# Patient Record
Sex: FEMALE | Race: WHITE | NOT HISPANIC OR LATINO
[De-identification: names, ages, dates, MRNs, and addresses within clinical notes are randomized per-mention and may not be internally consistent; named-entity substitution may affect disease eponyms.]

---

## 2023-03-09 ENCOUNTER — APPOINTMENT (OUTPATIENT)
Dept: ORTHOPEDIC SURGERY | Facility: CLINIC | Age: 83
End: 2023-03-09
Payer: MEDICARE

## 2023-03-09 DIAGNOSIS — I10 ESSENTIAL (PRIMARY) HYPERTENSION: ICD-10-CM

## 2023-03-09 DIAGNOSIS — Z86.39 PERSONAL HISTORY OF OTHER ENDOCRINE, NUTRITIONAL AND METABOLIC DISEASE: ICD-10-CM

## 2023-03-09 DIAGNOSIS — Z85.3 PERSONAL HISTORY OF MALIGNANT NEOPLASM OF BREAST: ICD-10-CM

## 2023-03-09 PROCEDURE — 73564 X-RAY EXAM KNEE 4 OR MORE: CPT | Mod: RT

## 2023-03-09 PROCEDURE — 99213 OFFICE O/P EST LOW 20 MIN: CPT | Mod: 25

## 2023-03-09 PROCEDURE — 20610 DRAIN/INJ JOINT/BURSA W/O US: CPT | Mod: 50

## 2023-03-09 NOTE — HISTORY OF PRESENT ILLNESS
[Gradual] : gradual [4] : 4 [7] : 7 [Dull/Aching] : dull/aching [Localized] : localized [Constant] : constant [Household chores] : household chores [Leisure] : leisure [Sleep] : sleep [Meds] : meds [Sitting] : sitting [Standing] : standing [Walking] : walking [] : Post Surgical Visit: no [FreeTextEntry1] : B/L knees [FreeTextEntry5] : This is a 81 y/o F here for bilateral knee pain for many years with no specific injury. Hx Polymyalgia rheumatica. Hx bilateral meniscectomies. Previous Tx by Dr. Pearce with gel injections. Right knee pain > left knee pain. Pain most prevalent when getting up after prolonged sitting.  [FreeTextEntry9] : ibuprofen [de-identified] : 12/2021 [de-identified] : Dr. Pearce [de-identified] : n/a

## 2023-03-09 NOTE — ASSESSMENT
[FreeTextEntry1] : GOOD RELIEF WITH ORTHOVISC 2 YRS AGO\par PAIN RECURRED\par NO NEW INJURY\par XRAYS STABLE\par START SERIES AGAIN, R/B/A REVIEWED

## 2023-03-09 NOTE — IMAGING
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

## 2023-03-16 ENCOUNTER — APPOINTMENT (OUTPATIENT)
Dept: ORTHOPEDIC SURGERY | Facility: CLINIC | Age: 83
End: 2023-03-16
Payer: MEDICARE

## 2023-03-16 VITALS — HEIGHT: 66 IN | WEIGHT: 130 LBS | BODY MASS INDEX: 20.89 KG/M2

## 2023-03-16 DIAGNOSIS — Z87.39 PERSONAL HISTORY OF OTHER DISEASES OF THE MUSCULOSKELETAL SYSTEM AND CONNECTIVE TISSUE: ICD-10-CM

## 2023-03-16 PROCEDURE — 99024 POSTOP FOLLOW-UP VISIT: CPT

## 2023-03-16 PROCEDURE — 20610 DRAIN/INJ JOINT/BURSA W/O US: CPT | Mod: 50

## 2023-03-16 NOTE — PROCEDURE
[FreeTextEntry3] : Large joint injection was performed of the BILAT knee. \par The indication for this procedure was pain, inflammation and x-ray evidence of Osteoarthritis on this or prior visit. The site was prepped with alcohol, betadine, ethyl chloride sprayed topically and sterile technique used. \par An injection of ORTHOVISC 2ml, series #2 was used.  \par Patient was advised to call if redness, pain or fever occur, apply ice for 15 minutes out of every hour for the next 12-24 hours as tolerated and patient was advised to rest the joint(s) for 2 days. Patient has tried OTC's including aspirin, Ibuprofen, Aleve, etc or prescription NSAIDS, and/or exercises at home and/or physical therapy without satisfactory response, patient had decreased mobility in the joint and the risks benefits, and alternatives have been discussed, and verbal consent was obtained. \par

## 2023-03-16 NOTE — HISTORY OF PRESENT ILLNESS
[Dull/Aching] : dull/aching [Localized] : localized [Retired] : Work status: retired [2] : 2 [Orthovisc] : Orthovisc [Gradual] : gradual [4] : 4 [7] : 7 [Constant] : constant [Household chores] : household chores [Leisure] : leisure [Sleep] : sleep [Meds] : meds [Sitting] : sitting [Standing] : standing [Walking] : walking [de-identified] : 3/16/23: Here for Orthovisc #2 bilateral knees  [] : Post Surgical Visit: no [FreeTextEntry1] : B/L knees [FreeTextEntry3] : N/A Chronic [FreeTextEntry5] : This is a 83 y/o F here for bilateral knee pain for many years with no specific injury. Hx Polymyalgia rheumatica. Hx bilateral meniscectomies. Previous Tx by Dr. Pearce with gel injections. Right knee pain > left knee pain. Pain most prevalent when getting up after prolonged sitting.  [FreeTextEntry9] : ibuprofen [de-identified] : 12/2021 [de-identified] : Dr. 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

## 2023-03-23 ENCOUNTER — APPOINTMENT (OUTPATIENT)
Dept: ORTHOPEDIC SURGERY | Facility: CLINIC | Age: 83
End: 2023-03-23
Payer: MEDICARE

## 2023-03-23 PROCEDURE — 99024 POSTOP FOLLOW-UP VISIT: CPT

## 2023-03-23 PROCEDURE — 20610 DRAIN/INJ JOINT/BURSA W/O US: CPT | Mod: 50

## 2023-03-23 NOTE — HISTORY OF PRESENT ILLNESS
[Gradual] : gradual [4] : 4 [Dull/Aching] : dull/aching [Localized] : localized [Constant] : constant [Household chores] : household chores [Leisure] : leisure [Sleep] : sleep [Meds] : meds [Sitting] : sitting [Standing] : standing [Walking] : walking [Retired] : Work status: retired [3] : 3 [Orthovisc] : Orthovisc [5] : 5 [de-identified] : 3/16/23: Here for Orthovisc #2 bilateral knees  [] : Post Surgical Visit: no [FreeTextEntry1] : B/L knees [FreeTextEntry3] : N/A Chronic [FreeTextEntry5] : This is a 81 y/o F here for bilateral knee pain for many years with no specific injury. Hx Polymyalgia rheumatica. Hx bilateral meniscectomies. Previous Tx by Dr. Pearce with gel injections. Right knee pain > left knee pain. Pain most prevalent when getting up after prolonged sitting.  [FreeTextEntry9] : ibuprofen [de-identified] : 12/2021 [de-identified] : Dr. 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

## 2023-03-30 ENCOUNTER — APPOINTMENT (OUTPATIENT)
Dept: ORTHOPEDIC SURGERY | Facility: CLINIC | Age: 83
End: 2023-03-30
Payer: MEDICARE

## 2023-03-30 VITALS — WEIGHT: 130 LBS | HEIGHT: 66 IN | BODY MASS INDEX: 20.89 KG/M2

## 2023-03-30 DIAGNOSIS — M17.12 UNILATERAL PRIMARY OSTEOARTHRITIS, LEFT KNEE: ICD-10-CM

## 2023-03-30 DIAGNOSIS — M17.11 UNILATERAL PRIMARY OSTEOARTHRITIS, RIGHT KNEE: ICD-10-CM

## 2023-03-30 PROCEDURE — 20610 DRAIN/INJ JOINT/BURSA W/O US: CPT | Mod: 50

## 2023-03-30 PROCEDURE — 99024 POSTOP FOLLOW-UP VISIT: CPT

## 2023-03-30 NOTE — HISTORY OF PRESENT ILLNESS
[Gradual] : gradual [5] : 5 [Dull/Aching] : dull/aching [Localized] : localized [Constant] : constant [Household chores] : household chores [Leisure] : leisure [Sleep] : sleep [Meds] : meds [Sitting] : sitting [Standing] : standing [Walking] : walking [Retired] : Work status: retired [4] : 4 [Orthovisc] : Orthovisc [de-identified] : 3/16/23: Here for Orthovisc #2 bilateral knees  [] : Post Surgical Visit: no [FreeTextEntry1] : B/L knees [FreeTextEntry3] : N/A Chronic [FreeTextEntry5] : This is a 81 y/o F here for bilateral knee pain for many years with no specific injury. Hx Polymyalgia rheumatica. Hx bilateral meniscectomies. Previous Tx by Dr. Pearce with gel injections. Right knee pain > left knee pain. Pain most prevalent when getting up after prolonged sitting.  [FreeTextEntry9] : ibuprofen [de-identified] : 12/2021 [de-identified] : Dr. 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

## 2024-06-11 ENCOUNTER — APPOINTMENT (OUTPATIENT)
Dept: MRI IMAGING | Facility: CLINIC | Age: 84
End: 2024-06-11
Payer: MEDICARE

## 2024-06-11 ENCOUNTER — APPOINTMENT (OUTPATIENT)
Dept: ORTHOPEDIC SURGERY | Facility: CLINIC | Age: 84
End: 2024-06-11
Payer: MEDICARE

## 2024-06-11 VITALS — WEIGHT: 130 LBS | BODY MASS INDEX: 20.89 KG/M2 | HEIGHT: 66 IN

## 2024-06-11 DIAGNOSIS — I48.91 UNSPECIFIED ATRIAL FIBRILLATION: ICD-10-CM

## 2024-06-11 DIAGNOSIS — Z86.73 PERSONAL HISTORY OF TRANSIENT ISCHEMIC ATTACK (TIA), AND CEREBRAL INFARCTION W/OUT RESIDUAL DEFICITS: ICD-10-CM

## 2024-06-11 DIAGNOSIS — Z78.9 OTHER SPECIFIED HEALTH STATUS: ICD-10-CM

## 2024-06-11 DIAGNOSIS — M51.36 OTHER INTERVERTEBRAL DISC DEGENERATION, LUMBAR REGION: ICD-10-CM

## 2024-06-11 PROCEDURE — 72110 X-RAY EXAM L-2 SPINE 4/>VWS: CPT

## 2024-06-11 PROCEDURE — 99214 OFFICE O/P EST MOD 30 MIN: CPT

## 2024-06-11 PROCEDURE — 72070 X-RAY EXAM THORAC SPINE 2VWS: CPT

## 2024-06-11 PROCEDURE — 72170 X-RAY EXAM OF PELVIS: CPT

## 2024-06-11 PROCEDURE — 72148 MRI LUMBAR SPINE W/O DYE: CPT

## 2024-06-11 RX ORDER — WARFARIN SODIUM 6 MG/1
TABLET ORAL
Refills: 0 | Status: ACTIVE | COMMUNITY

## 2024-06-11 RX ORDER — HYDROCODONE BITARTRATE AND ACETAMINOPHEN 10; 325 MG/1; MG/1
10-325 TABLET ORAL TWICE DAILY
Qty: 30 | Refills: 0 | Status: ACTIVE | COMMUNITY
Start: 2024-06-11 | End: 1900-01-01

## 2024-06-11 NOTE — IMAGING
[FreeTextEntry1] : Multilevel degenerative changes including spondylolisthesis at 4 5, compression fracture with significant wedge deformity noted at L1 age undetermined [de-identified] : Normal

## 2024-06-11 NOTE — HISTORY OF PRESENT ILLNESS
[Lower back] : lower back [8] : 8 [Dull/Aching] : dull/aching [Radiating] : radiating [Constant] : constant [Nothing helps with pain getting better] : Nothing helps with pain getting better [de-identified] :  06/11/2024: She is for initial orthopedic evaluation states she had a trip and fall in her home approximately 1 week ago.  She was taken to [facility] was evaluated for lower back pain and limited ability to ambulate.  She notes she was found to have an acute L1 fracture.  She was given a prescription for methocarbamol with minimal help.  She has since been walking with a walker.  She has also noted radicular complaints and tingling in the left lower extremity from the knee into the left foot.  Past medical history of CVA and A-fib she notes she is presently taking Coumadin [] : no [FreeTextEntry5] : 82 yo F presenting with L1 fx that occurred after falling at home 1 week ago. Went to [facility] ER; found fx. Given muscle relaxers; helped slightly. Numbness into b/l feet.  [FreeTextEntry7] : b/l legs

## 2024-06-13 ENCOUNTER — APPOINTMENT (OUTPATIENT)
Dept: ORTHOPEDIC SURGERY | Facility: CLINIC | Age: 84
End: 2024-06-13
Payer: MEDICARE

## 2024-06-13 PROCEDURE — 99214 OFFICE O/P EST MOD 30 MIN: CPT

## 2024-06-13 NOTE — HISTORY OF PRESENT ILLNESS
[Lower back] : lower back [8] : 8 [Dull/Aching] : dull/aching [Radiating] : radiating [Constant] : constant [Nothing helps with pain getting better] : Nothing helps with pain getting better [de-identified] : 6/13/24: Follow up L spine. MRI review.   06/11/2024: She is for initial orthopedic evaluation states she had a trip and fall in her home approximately 1 week ago.  She was taken to [facility] was evaluated for lower back pain and limited ability to ambulate.  She notes she was found to have an acute L1 fracture.  She was given a prescription for methocarbamol with minimal help.  She has since been walking with a walker.  She has also noted radicular complaints and tingling in the left lower extremity from the knee into the left foot.  Past medical history of CVA and A-fib she notes she is presently taking Coumadin [] : no [FreeTextEntry5] : MRI REVIEW L SPINE [FreeTextEntry7] : b/l legs

## 2024-06-13 NOTE — IMAGING
[FreeTextEntry1] : Multilevel degenerative changes including spondylolisthesis at 4 5, compression fracture with significant wedge deformity noted at L1 age undetermined [de-identified] : Normal

## 2024-06-13 NOTE — DATA REVIEWED
[MRI] : MRI [Lumbar Spine] : lumbar spine [Report was reviewed and noted in the chart] : The report was reviewed and noted in the chart [I independently reviewed and interpreted images and report] : I independently reviewed and interpreted images and report [FreeTextEntry1] : [facility] MRI 6/11/24 1. Acute/subacute L1 40% burst compression fx with cortical retropulsion w/o cord compression.

## 2024-06-13 NOTE — ASSESSMENT
[FreeTextEntry1] : 84 y/o F with acute low back pain after sustaining a fall about a week ago. MRI confirms L1 burst compression fx w/o spinal cord compression. Symptoms mildly improving with meds. Consider lowering dose d/t side effects with minimal dosage.   - Discussed natural healing of these types of fx's. If symptoms worsen in the next few days, will consider Kyphoplasty.  - Will give her a call to eval her progress over the weekend.

## 2024-06-20 ENCOUNTER — APPOINTMENT (OUTPATIENT)
Dept: ORTHOPEDIC SURGERY | Facility: CLINIC | Age: 84
End: 2024-06-20
Payer: MEDICARE

## 2024-06-20 DIAGNOSIS — S32.010A WEDGE COMPRESSION FRACTURE OF FIRST LUMBAR VERTEBRA, INITIAL ENCOUNTER FOR CLOSED FRACTURE: ICD-10-CM

## 2024-06-20 DIAGNOSIS — M54.16 RADICULOPATHY, LUMBAR REGION: ICD-10-CM

## 2024-06-20 DIAGNOSIS — M62.830 MUSCLE SPASM OF BACK: ICD-10-CM

## 2024-06-20 DIAGNOSIS — S32.020G WEDGE COMPRESSION FRACTURE OF SECOND LUMBAR VERTEBRA, SUBSEQUENT ENCOUNTER FOR FRACTURE WITH DELAYED HEALING: ICD-10-CM

## 2024-06-20 DIAGNOSIS — S32.000A WEDGE COMPRESSION FRACTURE OF UNSPECIFIED LUMBAR VERTEBRA, INITIAL ENCOUNTER FOR CLOSED FRACTURE: ICD-10-CM

## 2024-06-20 PROCEDURE — 72070 X-RAY EXAM THORAC SPINE 2VWS: CPT

## 2024-06-20 PROCEDURE — 72100 X-RAY EXAM L-S SPINE 2/3 VWS: CPT

## 2024-06-20 PROCEDURE — 99214 OFFICE O/P EST MOD 30 MIN: CPT

## 2024-06-22 PROBLEM — S32.020G: Status: ACTIVE | Noted: 2024-06-22

## 2024-06-22 NOTE — HISTORY OF PRESENT ILLNESS
[Lower back] : lower back [8] : 8 [Dull/Aching] : dull/aching [Radiating] : radiating [Constant] : constant [Nothing helps with pain getting better] : Nothing helps with pain getting better [de-identified] : 6/20/24: here for f/u - still with signficnat back pain, new distal leg pain and n/t. Pain meds but doesnt like taking them. Interested in kyphoplasty  6/13/24: Follow up L spine. MRI review.   06/11/2024: She is for initial orthopedic evaluation states she had a trip and fall in her home approximately 1 week ago.  She was taken to [facility] was evaluated for lower back pain and limited ability to ambulate.  She notes she was found to have an acute L1 fracture.  She was given a prescription for methocarbamol with minimal help.  She has since been walking with a walker.  She has also noted radicular complaints and tingling in the left lower extremity from the knee into the left foot.  Past medical history of CVA and A-fib she notes she is presently taking Coumadin [] : no [FreeTextEntry5] : Follow Up L SPINE. No changes since last visit.  [FreeTextEntry7] : b/l legs

## 2024-06-22 NOTE — ASSESSMENT
[FreeTextEntry1] : 84 y/o F with acute low back pain after sustaining a fall about 2 weeks ago. MRI confirms L1 burst compression fx w/o spinal cord compression. Pain is intractable despite time and meds.   - Discussed r/b/periop course of kyphoplasty. Patient wishes to proceed. Advised her radicular leg complaints likely from her slip at L4-5 and likely wouldnt resolve with kypho No

## 2024-06-22 NOTE — IMAGING
[FreeTextEntry1] : Multilevel degenerative changes including spondylolisthesis at 4 5, compression fracture with significant wedge deformity noted at L1 age undetermined [de-identified] : Normal

## 2024-06-24 ENCOUNTER — NON-APPOINTMENT (OUTPATIENT)
Age: 84
End: 2024-06-24

## 2024-07-02 ENCOUNTER — TRANSCRIPTION ENCOUNTER (OUTPATIENT)
Age: 84
End: 2024-07-02

## 2024-07-03 ENCOUNTER — APPOINTMENT (OUTPATIENT)
Dept: ORTHOPEDIC SURGERY | Facility: HOSPITAL | Age: 84
End: 2024-07-03

## 2024-07-03 ENCOUNTER — TRANSCRIPTION ENCOUNTER (OUTPATIENT)
Age: 84
End: 2024-07-03

## 2024-07-03 PROCEDURE — 22513 PERQ VERTEBRAL AUGMENTATION: CPT

## 2024-07-03 PROCEDURE — 22515 PERQ VERTEBRAL AUGMENTATION: CPT

## 2024-07-15 ENCOUNTER — NON-APPOINTMENT (OUTPATIENT)
Age: 84
End: 2024-07-15

## 2024-07-18 ENCOUNTER — APPOINTMENT (OUTPATIENT)
Dept: ORTHOPEDIC SURGERY | Facility: CLINIC | Age: 84
End: 2024-07-18
Payer: MEDICARE

## 2024-07-18 DIAGNOSIS — S32.010A WEDGE COMPRESSION FRACTURE OF FIRST LUMBAR VERTEBRA, INITIAL ENCOUNTER FOR CLOSED FRACTURE: ICD-10-CM

## 2024-07-18 PROCEDURE — 72070 X-RAY EXAM THORAC SPINE 2VWS: CPT

## 2024-07-18 PROCEDURE — 72100 X-RAY EXAM L-S SPINE 2/3 VWS: CPT

## 2024-07-18 PROCEDURE — 99024 POSTOP FOLLOW-UP VISIT: CPT

## 2024-08-15 ENCOUNTER — APPOINTMENT (OUTPATIENT)
Dept: ORTHOPEDIC SURGERY | Facility: CLINIC | Age: 84
End: 2024-08-15

## 2024-08-15 DIAGNOSIS — S32.000A WEDGE COMPRESSION FRACTURE OF UNSPECIFIED LUMBAR VERTEBRA, INITIAL ENCOUNTER FOR CLOSED FRACTURE: ICD-10-CM

## 2024-08-15 PROCEDURE — 72100 X-RAY EXAM L-S SPINE 2/3 VWS: CPT

## 2024-08-15 PROCEDURE — 99024 POSTOP FOLLOW-UP VISIT: CPT

## 2024-08-15 NOTE — ASSESSMENT
[FreeTextEntry1] : current visit:  new pain, persisting pain ;  XRs suggestive of new fracture adjacent to the index level;  MRI planned to determine if there is a new fracture;  kypho planned if there is a new fracture below the prior site  PO#1. 2 weeks s/p L1 kyphoplasty. Today's xr's shows cement slightly migrating into superior disc space. Doing okay, remains with right sided mid-low back pain. Will eval progress in the next 4 weeks.

## 2024-08-15 NOTE — IMAGING
[FreeTextEntry1] : Multilevel degenerative changes including spondylolisthesis at 4 5, compression fracture with significant wedge deformity noted at L1 age undetermined [de-identified] : Normal

## 2024-08-15 NOTE — HISTORY OF PRESENT ILLNESS
[Lower back] : lower back [8] : 8 [Dull/Aching] : dull/aching [Radiating] : radiating [Constant] : constant [Nothing helps with pain getting better] : Nothing helps with pain getting better [de-identified] : DOS: 7/3/24 L1 kyphoplasty   7/18/24: PO#1- L SPINE. Has pain on right side of midback; left sided pain has resolved.   6/20/24: here for f/u - still with signficnat back pain, new distal leg pain and n/t. Pain meds but doesnt like taking them. Interested in kyphoplasty  6/13/24: Follow up L spine. MRI review.   06/11/2024: She is for initial orthopedic evaluation states she had a trip and fall in her home approximately 1 week ago.  She was taken to [facility] was evaluated for lower back pain and limited ability to ambulate.  She notes she was found to have an acute L1 fracture.  She was given a prescription for methocarbamol with minimal help.  She has since been walking with a walker.  She has also noted radicular complaints and tingling in the left lower extremity from the knee into the left foot.  Past medical history of CVA and A-fib she notes she is presently taking Coumadin [] : no [FreeTextEntry7] : b/l legs

## 2024-08-17 ENCOUNTER — APPOINTMENT (OUTPATIENT)
Dept: MRI IMAGING | Facility: CLINIC | Age: 84
End: 2024-08-17
Payer: MEDICARE

## 2024-08-17 PROCEDURE — 72148 MRI LUMBAR SPINE W/O DYE: CPT | Mod: MH

## 2024-09-05 ENCOUNTER — APPOINTMENT (OUTPATIENT)
Dept: ORTHOPEDIC SURGERY | Facility: CLINIC | Age: 84
End: 2024-09-05

## 2024-09-05 DIAGNOSIS — S32.020G WEDGE COMPRESSION FRACTURE OF SECOND LUMBAR VERTEBRA, SUBSEQUENT ENCOUNTER FOR FRACTURE WITH DELAYED HEALING: ICD-10-CM

## 2024-09-05 PROCEDURE — 99214 OFFICE O/P EST MOD 30 MIN: CPT | Mod: 24

## 2024-09-09 NOTE — IMAGING
[FreeTextEntry1] : Multilevel degenerative changes including spondylolisthesis at 4 5, compression fracture with significant wedge deformity noted at L1 age undetermined [de-identified] : Normal

## 2024-09-09 NOTE — HISTORY OF PRESENT ILLNESS
[Lower back] : lower back [8] : 8 [Dull/Aching] : dull/aching [Radiating] : radiating [Constant] : constant [Nothing helps with pain getting better] : Nothing helps with pain getting better [de-identified] : DOS: 7/3/24 L1 kyphoplasty   9/5/24: Follow up L Spine. 2 months s/p L1 kyphoplasty. Pain has worsened. MRI review.   7/18/24: PO#1- L SPINE. Has pain on right side of midback; left sided pain has resolved.   6/20/24: here for f/u - still with signficnat back pain, new distal leg pain and n/t. Pain meds but doesnt like taking them. Interested in kyphoplasty  6/13/24: Follow up L spine. MRI review.   06/11/2024: She is for initial orthopedic evaluation states she had a trip and fall in her home approximately 1 week ago.  She was taken to [facility] was evaluated for lower back pain and limited ability to ambulate.  She notes she was found to have an acute L1 fracture.  She was given a prescription for methocarbamol with minimal help.  She has since been walking with a walker.  She has also noted radicular complaints and tingling in the left lower extremity from the knee into the left foot.  Past medical history of CVA and A-fib she notes she is presently taking Coumadin [] : no [FreeTextEntry5] : MRI REVIEW- L SPINE [FreeTextEntry7] : b/l legs

## 2024-09-09 NOTE — ASSESSMENT
[FreeTextEntry1] : 2 months s/p L1 kyphoplasty with worsening postop low back pain. MRI shows acute L2 compression fx. Kimberly would like to proceed with kyphoplasty, since with her first fx didn't heal despite time.   The risks and benefits of surgery have been discussed. Risks include but are not limited to bleeding, infection, reaction to anesthesia, injury to blood vessels and nerves, malunion, nonunion, DVT, PE, necessity of repeat surgery, CF leak/repair, chronic pain, loss of limb and death. The patient understands the risks and agrees with the surgical plan. All questions have been answered.

## 2024-09-09 NOTE — DATA REVIEWED
[MRI] : MRI [Lumbar Spine] : lumbar spine [Report was reviewed and noted in the chart] : The report was reviewed and noted in the chart [I independently reviewed and interpreted images and report] : I independently reviewed and interpreted images and report [FreeTextEntry1] : [facility] Spine MRI 8/22/24 1. Acute L2 compression fx   [facility]ine MRI 6/11/24 1. Acute/subacute L1 40% burst compression fx with cortical retropulsion w/o cord compression.

## 2024-09-09 NOTE — HISTORY OF PRESENT ILLNESS
[Lower back] : lower back [8] : 8 [Dull/Aching] : dull/aching [Radiating] : radiating [Constant] : constant [Nothing helps with pain getting better] : Nothing helps with pain getting better [de-identified] : DOS: 7/3/24 L1 kyphoplasty   9/5/24: Follow up L Spine. 2 months s/p L1 kyphoplasty. Pain has worsened. MRI review.   7/18/24: PO#1- L SPINE. Has pain on right side of midback; left sided pain has resolved.   6/20/24: here for f/u - still with signficnat back pain, new distal leg pain and n/t. Pain meds but doesnt like taking them. Interested in kyphoplasty  6/13/24: Follow up L spine. MRI review.   06/11/2024: She is for initial orthopedic evaluation states she had a trip and fall in her home approximately 1 week ago.  She was taken to [facility] was evaluated for lower back pain and limited ability to ambulate.  She notes she was found to have an acute L1 fracture.  She was given a prescription for methocarbamol with minimal help.  She has since been walking with a walker.  She has also noted radicular complaints and tingling in the left lower extremity from the knee into the left foot.  Past medical history of CVA and A-fib she notes she is presently taking Coumadin [] : no [FreeTextEntry5] : MRI REVIEW- L SPINE [FreeTextEntry7] : b/l legs

## 2024-09-09 NOTE — IMAGING
[FreeTextEntry1] : Multilevel degenerative changes including spondylolisthesis at 4 5, compression fracture with significant wedge deformity noted at L1 age undetermined [de-identified] : Normal

## 2024-09-12 ENCOUNTER — NON-APPOINTMENT (OUTPATIENT)
Age: 84
End: 2024-09-12

## 2024-09-27 ENCOUNTER — TRANSCRIPTION ENCOUNTER (OUTPATIENT)
Age: 84
End: 2024-09-27

## 2024-09-27 ENCOUNTER — APPOINTMENT (OUTPATIENT)
Dept: ORTHOPEDIC SURGERY | Facility: HOSPITAL | Age: 84
End: 2024-09-27

## 2024-09-27 PROCEDURE — 22514 PERQ VERTEBRAL AUGMENTATION: CPT
